# Patient Record
Sex: MALE | Race: WHITE | Employment: OTHER | URBAN - METROPOLITAN AREA
[De-identification: names, ages, dates, MRNs, and addresses within clinical notes are randomized per-mention and may not be internally consistent; named-entity substitution may affect disease eponyms.]

---

## 2017-09-24 PROBLEM — E11.9 DIABETES MELLITUS (HCC): Status: ACTIVE | Noted: 2017-09-24

## 2017-09-24 PROBLEM — G47.00 INSOMNIA: Status: ACTIVE | Noted: 2017-09-24

## 2017-09-24 PROBLEM — R65.10 SIRS (SYSTEMIC INFLAMMATORY RESPONSE SYNDROME) (HCC): Status: ACTIVE | Noted: 2017-09-24

## 2017-09-24 PROBLEM — N13.8 BPH WITH OBSTRUCTION/LOWER URINARY TRACT SYMPTOMS: Status: ACTIVE | Noted: 2017-09-24

## 2017-09-24 PROBLEM — G47.33 OBSTRUCTIVE SLEEP APNEA: Status: ACTIVE | Noted: 2017-09-24

## 2017-09-24 PROBLEM — N40.1 BPH WITH OBSTRUCTION/LOWER URINARY TRACT SYMPTOMS: Status: ACTIVE | Noted: 2017-09-24

## 2017-09-24 PROBLEM — N39.0 UTI (URINARY TRACT INFECTION): Status: ACTIVE | Noted: 2017-09-24

## 2017-09-24 PROBLEM — F41.9 ANXIETY: Status: ACTIVE | Noted: 2017-09-24

## 2017-09-24 PROBLEM — E78.5 HYPERLIPIDEMIA: Status: ACTIVE | Noted: 2017-09-24

## 2018-04-12 PROBLEM — N39.0 UTI (URINARY TRACT INFECTION): Status: RESOLVED | Noted: 2017-09-24 | Resolved: 2018-04-12

## 2018-10-10 ENCOUNTER — HOSPITAL ENCOUNTER (OUTPATIENT)
Age: 68
Discharge: HOME | End: 2018-10-12
Payer: MEDICARE

## 2018-10-10 LAB — PROSTATE SPECIFIC ANTIGEN: 0.2 NG/ML (ref 0–4)

## 2018-10-10 PROCEDURE — 84153 ASSAY OF PSA TOTAL: CPT

## 2018-10-10 PROCEDURE — 87088 URINE BACTERIA CULTURE: CPT

## 2018-10-12 LAB — URINE CULTURE, ROUTINE: NORMAL

## 2019-10-25 ENCOUNTER — HOSPITAL ENCOUNTER (EMERGENCY)
Age: 69
Discharge: HOME | End: 2019-10-25
Attending: EMERGENCY MEDICINE
Payer: MEDICARE

## 2019-10-25 VITALS
WEIGHT: 215 LBS | OXYGEN SATURATION: 95 % | BODY MASS INDEX: 29.12 KG/M2 | DIASTOLIC BLOOD PRESSURE: 72 MMHG | RESPIRATION RATE: 16 BRPM | SYSTOLIC BLOOD PRESSURE: 132 MMHG | HEART RATE: 90 BPM | TEMPERATURE: 98 F | HEIGHT: 72 IN

## 2019-10-25 DIAGNOSIS — S01.01XA LACERATION OF SCALP, INITIAL ENCOUNTER: Primary | ICD-10-CM

## 2019-10-25 PROCEDURE — 99282 EMERGENCY DEPT VISIT SF MDM: CPT

## 2019-10-25 PROCEDURE — 6370000000 HC RX 637 (ALT 250 FOR IP): Performed by: EMERGENCY MEDICINE

## 2019-10-25 RX ORDER — CEPHALEXIN 500 MG/1
500 CAPSULE ORAL ONCE
Status: COMPLETED | OUTPATIENT
Start: 2019-10-25 | End: 2019-10-25

## 2019-10-25 RX ORDER — MIRTAZAPINE 15 MG/1
15 TABLET, FILM COATED ORAL NIGHTLY
COMMUNITY

## 2019-10-25 RX ORDER — CEPHALEXIN 500 MG/1
500 CAPSULE ORAL 2 TIMES DAILY
Qty: 10 CAPSULE | Refills: 0 | Status: SHIPPED | OUTPATIENT
Start: 2019-10-25 | End: 2019-10-30

## 2019-10-25 RX ADMIN — CEPHALEXIN 500 MG: 500 CAPSULE ORAL at 20:24

## 2021-02-12 ENCOUNTER — IMMUNIZATION (OUTPATIENT)
Dept: PRIMARY CARE CLINIC | Age: 71
End: 2021-02-12
Payer: MEDICARE

## 2021-02-12 PROCEDURE — 91300 COVID-19, PFIZER VACCINE 30MCG/0.3ML DOSE: CPT | Performed by: CLINICAL NURSE SPECIALIST

## 2021-02-12 PROCEDURE — 0001A COVID-19, PFIZER VACCINE 30MCG/0.3ML DOSE: CPT | Performed by: CLINICAL NURSE SPECIALIST

## 2021-03-08 ENCOUNTER — IMMUNIZATION (OUTPATIENT)
Dept: PRIMARY CARE CLINIC | Age: 71
End: 2021-03-08
Payer: MEDICARE

## 2021-03-08 PROCEDURE — 91300 COVID-19, PFIZER VACCINE 30MCG/0.3ML DOSE: CPT | Performed by: PHYSICIAN ASSISTANT

## 2021-03-08 PROCEDURE — 0002A COVID-19, PFIZER VACCINE 30MCG/0.3ML DOSE: CPT | Performed by: PHYSICIAN ASSISTANT

## 2022-05-27 ENCOUNTER — HOSPITAL ENCOUNTER (OUTPATIENT)
Age: 72
Discharge: HOME | End: 2022-05-29

## 2022-05-27 PROCEDURE — 88305 TISSUE EXAM BY PATHOLOGIST: CPT

## 2022-12-01 ENCOUNTER — HOSPITAL ENCOUNTER (OUTPATIENT)
Dept: HOSPITAL 83 - RESCLI | Age: 72
Discharge: HOME | End: 2022-12-01
Attending: STUDENT IN AN ORGANIZED HEALTH CARE EDUCATION/TRAINING PROGRAM
Payer: MEDICARE

## 2022-12-01 DIAGNOSIS — Z72.89: ICD-10-CM

## 2022-12-01 DIAGNOSIS — E78.5: ICD-10-CM

## 2022-12-01 DIAGNOSIS — Z90.49: ICD-10-CM

## 2022-12-01 DIAGNOSIS — G47.33: ICD-10-CM

## 2022-12-01 DIAGNOSIS — I10: ICD-10-CM

## 2022-12-01 DIAGNOSIS — Z79.84: ICD-10-CM

## 2022-12-01 DIAGNOSIS — Z79.899: ICD-10-CM

## 2022-12-01 DIAGNOSIS — F41.9: ICD-10-CM

## 2022-12-01 DIAGNOSIS — Z98.890: ICD-10-CM

## 2022-12-01 DIAGNOSIS — E11.9: Primary | ICD-10-CM

## 2023-11-15 ENCOUNTER — HOSPITAL ENCOUNTER (OUTPATIENT)
Dept: HOSPITAL 83 - RESCLI | Age: 73
Discharge: HOME | End: 2023-11-15
Attending: STUDENT IN AN ORGANIZED HEALTH CARE EDUCATION/TRAINING PROGRAM
Payer: MEDICARE

## 2023-11-15 DIAGNOSIS — N52.9: ICD-10-CM

## 2023-11-15 DIAGNOSIS — G47.00: ICD-10-CM

## 2023-11-15 DIAGNOSIS — E78.5: Primary | ICD-10-CM

## 2023-11-15 DIAGNOSIS — Z79.899: ICD-10-CM

## 2023-11-15 DIAGNOSIS — E11.9: ICD-10-CM

## 2023-11-15 DIAGNOSIS — Z98.890: ICD-10-CM

## 2025-07-02 ENCOUNTER — OFFICE VISIT (OUTPATIENT)
Dept: ORTHOPEDIC SURGERY | Age: 75
End: 2025-07-02
Payer: MEDICARE

## 2025-07-02 VITALS
TEMPERATURE: 97.4 F | BODY MASS INDEX: 29.16 KG/M2 | DIASTOLIC BLOOD PRESSURE: 66 MMHG | HEART RATE: 70 BPM | SYSTOLIC BLOOD PRESSURE: 122 MMHG | HEIGHT: 72 IN | RESPIRATION RATE: 16 BRPM | OXYGEN SATURATION: 96 %

## 2025-07-02 DIAGNOSIS — M25.552 BILATERAL HIP PAIN: Primary | ICD-10-CM

## 2025-07-02 DIAGNOSIS — M25.551 BILATERAL HIP PAIN: Primary | ICD-10-CM

## 2025-07-02 PROCEDURE — G8419 CALC BMI OUT NRM PARAM NOF/U: HCPCS | Performed by: ORTHOPAEDIC SURGERY

## 2025-07-02 PROCEDURE — 3017F COLORECTAL CA SCREEN DOC REV: CPT | Performed by: ORTHOPAEDIC SURGERY

## 2025-07-02 PROCEDURE — 99204 OFFICE O/P NEW MOD 45 MIN: CPT | Performed by: ORTHOPAEDIC SURGERY

## 2025-07-02 PROCEDURE — 1123F ACP DISCUSS/DSCN MKR DOCD: CPT | Performed by: ORTHOPAEDIC SURGERY

## 2025-07-02 PROCEDURE — 1159F MED LIST DOCD IN RCRD: CPT | Performed by: ORTHOPAEDIC SURGERY

## 2025-07-02 PROCEDURE — 1036F TOBACCO NON-USER: CPT | Performed by: ORTHOPAEDIC SURGERY

## 2025-07-02 PROCEDURE — G8427 DOCREV CUR MEDS BY ELIG CLIN: HCPCS | Performed by: ORTHOPAEDIC SURGERY

## 2025-07-02 RX ORDER — ACETAMINOPHEN 160 MG
2000 TABLET,DISINTEGRATING ORAL DAILY
COMMUNITY

## 2025-07-02 NOTE — PROGRESS NOTES
[facility]  ORTHOPAEDICS   DATE OF VISIT: 07/02/25  New  Patient     Referring Provider:   Miguel Casiano,   [id number] [place] [address]    CHIEF COMPLAINT:   Chief Complaint   Patient presents with    Hip Pain     Patient presents today for Rt hip, states he in early March he was playing pickle ball and \"stepped wrong.\" H/o physical therapy. Reports trouble sleeping at night/dull. Rates his pain 1 on a 0-10 pain scale.        HPI:      History of Present Illness  The patient presents for a new patient visit with complaints of right hip pain.    He reports experiencing discomfort in his right hip, which he attributes to an incident while playing pickleball in [place]. He was receiving therapy for a sciatic nerve issue in his left hip at the time, which had been resolved. However, during a game, he stepped with his right foot and began to experience pain in his right hip. The therapist managed to alleviate the pain to a manageable level before he returned home. He sleeps on his sides at night, alternating between the left and right side. He found it uncomfortable to sleep on his right side due to the pain, although it was not severe.     He consulted Dr. Casiano on 04/16/2025, who recommended an x-ray. 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 The pain has been tender and dull, but it has improved over time. He can now sleep on his side, although there is still some residual dull pain. He recalls a similar issue with his shoulder a few years ago, which was resolved with a second cortisone injection. He believes a cortisone injection might help with his current hip pain. The pain is localized around the bone and is more noticeable when he turns over onto that side at night. It is not severe, but it is uncomfortable enough to disrupt his sleep. He also mentions occasional back pain.    SOCIAL HISTORY  Occupations: Retired, previously worked for [employer] and a .  Exercise: Plays

## 2025-08-05 ENCOUNTER — OFFICE VISIT (OUTPATIENT)
Dept: ORTHOPEDIC SURGERY | Age: 75
End: 2025-08-05

## 2025-08-05 VITALS
DIASTOLIC BLOOD PRESSURE: 72 MMHG | HEIGHT: 72 IN | TEMPERATURE: 98.2 F | HEART RATE: 68 BPM | RESPIRATION RATE: 20 BRPM | BODY MASS INDEX: 26.41 KG/M2 | SYSTOLIC BLOOD PRESSURE: 144 MMHG | WEIGHT: 195 LBS | OXYGEN SATURATION: 94 %

## 2025-08-05 DIAGNOSIS — M25.551 RIGHT HIP PAIN: Primary | ICD-10-CM

## 2025-08-05 RX ORDER — BUPIVACAINE HYDROCHLORIDE 2.5 MG/ML
2 INJECTION, SOLUTION INFILTRATION; PERINEURAL ONCE
Status: COMPLETED | OUTPATIENT
Start: 2025-08-05 | End: 2025-08-05

## 2025-08-05 RX ORDER — LIDOCAINE HYDROCHLORIDE 10 MG/ML
3 INJECTION, SOLUTION INFILTRATION; PERINEURAL ONCE
Status: COMPLETED | OUTPATIENT
Start: 2025-08-05 | End: 2025-08-05

## 2025-08-05 RX ORDER — TRIAMCINOLONE ACETONIDE 40 MG/ML
40 INJECTION, SUSPENSION INTRA-ARTICULAR; INTRAMUSCULAR ONCE
Status: COMPLETED | OUTPATIENT
Start: 2025-08-05 | End: 2025-08-05

## 2025-08-05 RX ADMIN — TRIAMCINOLONE ACETONIDE 40 MG: 40 INJECTION, SUSPENSION INTRA-ARTICULAR; INTRAMUSCULAR at 15:32

## 2025-08-05 RX ADMIN — LIDOCAINE HYDROCHLORIDE 3 ML: 10 INJECTION, SOLUTION INFILTRATION; PERINEURAL at 15:32

## 2025-08-05 RX ADMIN — BUPIVACAINE HYDROCHLORIDE 5 MG: 2.5 INJECTION, SOLUTION INFILTRATION; PERINEURAL at 15:31
